# Patient Record
Sex: FEMALE | Race: BLACK OR AFRICAN AMERICAN | Employment: OTHER | URBAN - METROPOLITAN AREA
[De-identification: names, ages, dates, MRNs, and addresses within clinical notes are randomized per-mention and may not be internally consistent; named-entity substitution may affect disease eponyms.]

---

## 2020-09-26 ENCOUNTER — HOSPITAL ENCOUNTER (EMERGENCY)
Age: 50
Discharge: HOME | End: 2020-09-26
Attending: EMERGENCY MEDICINE
Payer: COMMERCIAL

## 2020-09-26 VITALS
OXYGEN SATURATION: 100 % | TEMPERATURE: 99 F | DIASTOLIC BLOOD PRESSURE: 82 MMHG | RESPIRATION RATE: 16 BRPM | HEART RATE: 77 BPM | SYSTOLIC BLOOD PRESSURE: 110 MMHG

## 2020-09-26 PROCEDURE — 99282 EMERGENCY DEPT VISIT SF MDM: CPT

## 2020-09-26 RX ORDER — AMOXICILLIN 500 MG/1
500 CAPSULE ORAL 2 TIMES DAILY
Qty: 20 CAPSULE | Refills: 0 | Status: SHIPPED | OUTPATIENT
Start: 2020-09-26 | End: 2020-10-06

## 2020-09-26 RX ORDER — PSEUDOEPHEDRINE HYDROCHLORIDE 30 MG/1
30 TABLET ORAL EVERY 6 HOURS PRN
Qty: 12 TABLET | Refills: 1 | Status: SHIPPED | OUTPATIENT
Start: 2020-09-26 | End: 2020-09-29

## 2020-09-26 ASSESSMENT — ENCOUNTER SYMPTOMS
ABDOMINAL PAIN: 0
RHINORRHEA: 0
NAUSEA: 0
BLOOD IN STOOL: 0
SINUS PRESSURE: 0
WHEEZING: 0
SINUS PAIN: 0
TROUBLE SWALLOWING: 0
SORE THROAT: 0
COUGH: 0
DIARRHEA: 0
VOICE CHANGE: 0
SHORTNESS OF BREATH: 0
CONSTIPATION: 0
EYE DISCHARGE: 0
EYE ITCHING: 0
VOMITING: 0

## 2020-09-26 ASSESSMENT — PAIN DESCRIPTION - LOCATION: LOCATION: EAR

## 2020-09-26 ASSESSMENT — PAIN DESCRIPTION - PAIN TYPE: TYPE: ACUTE PAIN

## 2020-09-26 ASSESSMENT — PAIN SCALES - GENERAL: PAINLEVEL_OUTOF10: 7

## 2020-09-26 ASSESSMENT — PAIN DESCRIPTION - ORIENTATION: ORIENTATION: RIGHT

## 2020-09-26 NOTE — ED PROVIDER NOTES
ED Attending Attestation Note     Date of evaluation: 9/26/2020    This patient was seen by the resident, Dr. Eli Costa. I have seen and examined the patient, agree with the workup, evaluation, management and diagnosis. The care plan has been discussed. This is a pleasant 68-year-old female that presents today for right ear pain. She denies any associated fevers. Denies any shortness of breath or coughs. Eyes any history of diabetes or immunocompromise state. On exam, the patient is well-appearing in no acute distress. On her right TMs, there is an effusion with pus noted, however the TM is intact. Left TM is normal.  She has some right-sided cervical lymphadenopathy. Posterior pharynx visualized without exudates, normal uvula. We will treat with antibiotics. Return precautions were discussed. Motrin Tylenol as needed for ear pain. Follow-up with pulmonary care physician. Please see resident note for reassessments and final disposition.         Maribell Stewart MD  09/26/20 [id number]

## 2020-09-26 NOTE — ED NOTES
Patient prepared for and ready to be discharged. Dressed in clothes and given belongings. IV removed, pt tolerated well, no complications. Patient discharged at this time in no acute distress after she verbalized understanding of discharge instructions. Reviewed medications, and when to return to the ED with patient. Encouraged follow up with PCP  Patient walked to lobby with steady gait, to take self home.       Allanesha Smith-Narcisse, RN  09/26/20 [id number]

## 2020-09-26 NOTE — ED PROVIDER NOTES
[address] ENCOUNTER          EM RESIDENT NOTE       Date of evaluation: 9/26/2020    Chief Complaint     Otalgia and Ear Fullness      History of Present Illness     Anamaria Novoa is a 44 y.o. female who presents for right ear otalgia and fullness since Monday with increasing discomfort. Today she has been unable to' pop' her ears. She states that when she was younger she had a history of ear infections and required tympanostomy use. She has not had issues since. She denies any facial pain, fevers, chills, rhinorrhea, congestion. She has no history of diabetes, or other reasons to be immunosuppressed. Review of Systems     Review of Systems   Constitutional: Negative for activity change, chills and fever. HENT: Positive for ear pain. Negative for congestion, rhinorrhea, sinus pressure, sinus pain, sneezing, sore throat, tinnitus, trouble swallowing and voice change. Eyes: Negative for discharge and itching. Respiratory: Negative for cough, shortness of breath and wheezing. Cardiovascular: Negative for chest pain and palpitations. Gastrointestinal: Negative for abdominal pain, blood in stool, constipation, diarrhea, nausea and vomiting. Endocrine: Negative for cold intolerance and heat intolerance. Genitourinary: Negative for dysuria and flank pain. Musculoskeletal: Negative for gait problem and joint swelling. Skin: Negative for pallor and rash. Neurological: Negative for dizziness, light-headedness and headaches. Past Medical, Surgical, Family, and Social History     She has a past medical history of Cervical cancer screening, History of mammogram, MS (multiple sclerosis) ([facility] [address] and STD (sexually transmitted disease). She has a past surgical history that includes other surgical history and Stomach surgery (2009). Her family history includes Cancer in her father and mother; Diabetes in her father and mother.   She reports that she has never smoked. She has never used smokeless tobacco. She reports current alcohol use of about 2.0 standard drinks of alcohol per week. She reports that she does not use drugs. Medications     Previous Medications    HYDROXYZINE (ATARAX) 50 MG TABLET    Take 1 tablet by mouth nightly as needed Take 30-60mins before sleeping. MULTIPLE VITAMINS-MINERALS (THERAPEUTIC MULTIVITAMIN-MINERALS) TABLET    Take 1 tablet by mouth daily    NAPROXEN (NAPROSYN) 500 MG TABLET    Take 1 tablet by mouth 2 times daily (with meals)       Allergies     She has No Known Allergies. Physical Exam     INITIAL VITALS: BP: 110/82, Temp: 99 °F (37.2 °C), Pulse: 77, Resp: 16, SpO2: 100 %   Physical Exam  Constitutional:       General: She is not in acute distress. Appearance: She is well-developed. HENT:      Head: Normocephalic and atraumatic. Left Ear: Tympanic membrane normal.      Ears:      Comments: Right TM with bulging and erythema of the TM with post TM white/clear fluid collection. Nose: Nose normal.   Eyes:      General: No scleral icterus. Conjunctiva/sclera: Conjunctivae normal.      Pupils: Pupils are equal, round, and reactive to light. Neck:      Musculoskeletal: Normal range of motion and neck supple. Trachea: No tracheal deviation. Cardiovascular:      Rate and Rhythm: Normal rate and regular rhythm. Pulses: Normal pulses. Pulmonary:      Effort: Pulmonary effort is normal. No respiratory distress. Breath sounds: Normal breath sounds. No stridor. No wheezing or rales. Abdominal:      General: Bowel sounds are normal. There is no distension. Palpations: Abdomen is soft. There is no mass. Tenderness: There is no abdominal tenderness. There is no guarding or rebound. Musculoskeletal: Normal range of motion. General: No tenderness or deformity. Skin:     General: Skin is warm and dry. Capillary Refill: Capillary refill takes less than 2 seconds.       Findings: times daily for 10 days    PSEUDOEPHEDRINE (DECONGESTANT) 30 MG TABLET    Take 1 tablet by mouth every 6 hours as needed for Congestion       DISPOSITION Decision To Discharge 09/26/2020 02:56:24 Jay Shah MD  Resident  09/26/20 [id number]

## 2023-07-04 ENCOUNTER — HOSPITAL ENCOUNTER (EMERGENCY)
Age: 53
Discharge: HOME | End: 2023-07-04
Attending: EMERGENCY MEDICINE
Payer: COMMERCIAL

## 2023-07-04 VITALS
SYSTOLIC BLOOD PRESSURE: 122 MMHG | HEART RATE: 74 BPM | TEMPERATURE: 98.3 F | OXYGEN SATURATION: 98 % | HEIGHT: 63 IN | WEIGHT: 157 LBS | DIASTOLIC BLOOD PRESSURE: 81 MMHG | BODY MASS INDEX: 27.82 KG/M2 | RESPIRATION RATE: 16 BRPM

## 2023-07-04 DIAGNOSIS — H60.391 INFECTIVE OTITIS EXTERNA OF RIGHT EAR: Primary | ICD-10-CM

## 2023-07-04 PROCEDURE — 99283 EMERGENCY DEPT VISIT LOW MDM: CPT

## 2023-07-04 PROCEDURE — 6370000000 HC RX 637 (ALT 250 FOR IP): Performed by: EMERGENCY MEDICINE

## 2023-07-04 RX ORDER — AMOXICILLIN 500 MG/1
500 CAPSULE ORAL 3 TIMES DAILY
Qty: 21 CAPSULE | Refills: 0 | Status: SHIPPED | OUTPATIENT
Start: 2023-07-04 | End: 2023-07-11

## 2023-07-04 RX ORDER — AMOXICILLIN 250 MG/1
500 CAPSULE ORAL ONCE
Status: COMPLETED | OUTPATIENT
Start: 2023-07-04 | End: 2023-07-04

## 2023-07-04 RX ORDER — CIPROFLOXACIN AND DEXAMETHASONE 3; 1 MG/ML; MG/ML
4 SUSPENSION/ DROPS AURICULAR (OTIC) 2 TIMES DAILY
Qty: 7.5 ML | Refills: 0 | Status: SHIPPED | OUTPATIENT
Start: 2023-07-04 | End: 2023-07-11

## 2023-07-04 RX ADMIN — AMOXICILLIN 500 MG: 250 CAPSULE ORAL at 14:50

## 2023-07-04 ASSESSMENT — PAIN DESCRIPTION - ORIENTATION: ORIENTATION: RIGHT

## 2023-07-04 ASSESSMENT — PAIN - FUNCTIONAL ASSESSMENT: PAIN_FUNCTIONAL_ASSESSMENT: 0-10

## 2023-07-04 ASSESSMENT — PAIN DESCRIPTION - LOCATION: LOCATION: EAR

## 2023-07-04 ASSESSMENT — PAIN DESCRIPTION - DESCRIPTORS: DESCRIPTORS: PRESSURE

## 2023-07-04 ASSESSMENT — PAIN SCALES - GENERAL: PAINLEVEL_OUTOF10: 5

## 2023-07-04 NOTE — ED PROVIDER NOTES
[facility] Emergency Department      Pt Name: Yury Manuel  MRN: [id number]  [address] [facility] 1970  Date of evaluation: 7/4/2023  Provider: Rod Weeks MD  CHIEF COMPLAINT  Chief Complaint   Patient presents with    Otalgia     Pt presents with R ear pain/fullness. Had the same issue a year ago and followed with an ENT     HPI  Yury Manuel is a 52 y.o. female who presents because of right ear pain/fullness. She has had symptoms for the past 3 days. She had a similar issue about a year ago when she saw an ENT provider. She denies any fever. She has had some nasal congestion. She is been taking Claritin for that. She has noticed some drainage. Denies any nausea or vomiting. She has had a slight dizzy feeling but no falls and has a history of MS.    REVIEW OF SYSTEMS:  See HPI for further details. Remainder of pertinent ROS reviewed and negative. Nursing notes reviewed. PAST MEDICAL HISTORY  Past Medical History:   Diagnosis Date    Cervical cancer screening 8/2015    Negative:under care of gyn:Dr. Smith    History of mammogram 8/2015     Nml per pt'    MS (multiple sclerosis) ([address] [facility])     Diagnosed at 21yrs age:under care of neurologist:Dr. Trupti Alonso appt. STD (sexually transmitted disease) 2/2015    trichomonas     SURGICAL HISTORY  Past Surgical History:   Procedure Laterality Date    OTHER SURGICAL HISTORY      gastric sleeve    STOMACH SURGERY  2009    gastric sleeve     MEDICATIONS:  No current facility-administered medications on file prior to encounter. Current Outpatient Medications on File Prior to Encounter   Medication Sig Dispense Refill    Multiple Vitamins-Minerals (THERAPEUTIC MULTIVITAMIN-MINERALS) tablet Take 1 tablet by mouth daily      hydrOXYzine (ATARAX) 50 MG tablet Take 1 tablet by mouth nightly as needed Take 30-60mins before sleeping.  14 tablet 0    naproxen (NAPROSYN) 500 MG tablet Take 1 tablet by mouth 2 times daily (with meals) 30 tablet 0

## 2024-01-07 ENCOUNTER — APPOINTMENT (OUTPATIENT)
Dept: GENERAL RADIOLOGY | Age: 54
End: 2024-01-07
Payer: COMMERCIAL

## 2024-01-07 ENCOUNTER — HOSPITAL ENCOUNTER (EMERGENCY)
Age: 54
Discharge: HOME | End: 2024-01-07
Attending: EMERGENCY MEDICINE
Payer: COMMERCIAL

## 2024-01-07 VITALS
SYSTOLIC BLOOD PRESSURE: 140 MMHG | TEMPERATURE: 97.8 F | OXYGEN SATURATION: 100 % | RESPIRATION RATE: 14 BRPM | DIASTOLIC BLOOD PRESSURE: 87 MMHG | HEIGHT: 63 IN | BODY MASS INDEX: 29.07 KG/M2 | HEART RATE: 81 BPM | WEIGHT: 164.1 LBS

## 2024-01-07 DIAGNOSIS — R21 RASH AND OTHER NONSPECIFIC SKIN ERUPTION: Primary | ICD-10-CM

## 2024-01-07 LAB
ALBUMIN SERPL-MCNC: 4.4 G/DL (ref 3.4–5)
ALBUMIN/GLOB SERPL: 1.6 {RATIO} (ref 1.1–2.2)
ALP SERPL-CCNC: 83 U/L (ref 40–129)
ALT SERPL-CCNC: <5 U/L (ref 10–40)
ANION GAP SERPL CALCULATED.3IONS-SCNC: 10 MMOL/L (ref 3–16)
AST SERPL-CCNC: 15 U/L (ref 15–37)
BASOPHILS # BLD: 0 K/UL (ref 0–0.2)
BASOPHILS NFR BLD: 1 %
BILIRUB SERPL-MCNC: <0.2 MG/DL (ref 0–1)
BUN SERPL-MCNC: 13 MG/DL (ref 7–20)
CALCIUM SERPL-MCNC: 9.4 MG/DL (ref 8.3–10.6)
CHLORIDE SERPL-SCNC: 107 MMOL/L (ref 99–110)
CO2 SERPL-SCNC: 26 MMOL/L (ref 21–32)
CREAT SERPL-MCNC: 0.6 MG/DL (ref 0.6–1.1)
DEPRECATED RDW RBC AUTO: 13.7 % (ref 12.4–15.4)
EOSINOPHIL # BLD: 0.1 K/UL (ref 0–0.6)
EOSINOPHIL NFR BLD: 2 %
GFR SERPLBLD CREATININE-BSD FMLA CKD-EPI: >60 ML/MIN/{1.73_M2}
GLUCOSE SERPL-MCNC: 96 MG/DL (ref 70–99)
HCT VFR BLD AUTO: 35.5 % (ref 36–48)
HGB BLD-MCNC: 11.9 G/DL (ref 12–16)
LACTATE BLDV-SCNC: 1 MMOL/L (ref 0.4–1.9)
LYMPHOCYTES # BLD: 1.3 K/UL (ref 1–5.1)
LYMPHOCYTES NFR BLD: 33 %
MCH RBC QN AUTO: 33.7 PG (ref 26–34)
MCHC RBC AUTO-ENTMCNC: 33.6 G/DL (ref 31–36)
MCV RBC AUTO: 100.5 FL (ref 80–100)
MONOCYTES # BLD: 0.3 K/UL (ref 0–1.3)
MONOCYTES NFR BLD: 6.6 %
NEUTROPHILS # BLD: 2.2 K/UL (ref 1.7–7.7)
NEUTROPHILS NFR BLD: 57.4 %
PLATELET # BLD AUTO: 198 K/UL (ref 135–450)
PMV BLD AUTO: 10 FL (ref 5–10.5)
POTASSIUM SERPL-SCNC: 4 MMOL/L (ref 3.5–5.1)
PROT SERPL-MCNC: 7.2 G/DL (ref 6.4–8.2)
RBC # BLD AUTO: 3.53 M/UL (ref 4–5.2)
SLIDE REVIEW: ABNORMAL
SODIUM SERPL-SCNC: 143 MMOL/L (ref 136–145)
WBC # BLD AUTO: 3.8 K/UL (ref 4–11)

## 2024-01-07 PROCEDURE — 85025 COMPLETE CBC W/AUTO DIFF WBC: CPT

## 2024-01-07 PROCEDURE — 80053 COMPREHEN METABOLIC PANEL: CPT

## 2024-01-07 PROCEDURE — 99285 EMERGENCY DEPT VISIT HI MDM: CPT

## 2024-01-07 PROCEDURE — 86140 C-REACTIVE PROTEIN: CPT

## 2024-01-07 PROCEDURE — 85652 RBC SED RATE AUTOMATED: CPT

## 2024-01-07 PROCEDURE — 83605 ASSAY OF LACTIC ACID: CPT

## 2024-01-07 PROCEDURE — 87040 BLOOD CULTURE FOR BACTERIA: CPT

## 2024-01-07 PROCEDURE — 73660 X-RAY EXAM OF TOE(S): CPT

## 2024-01-07 RX ORDER — TIZANIDINE 2 MG/1
1-2 TABLET ORAL EVERY 8 HOURS PRN
COMMUNITY
Start: 2023-06-19

## 2024-01-07 RX ORDER — PREGABALIN 200 MG/1
200 CAPSULE ORAL 2 TIMES DAILY
COMMUNITY
Start: 2023-09-16 | End: 2024-01-15

## 2024-01-07 ASSESSMENT — PAIN - FUNCTIONAL ASSESSMENT: PAIN_FUNCTIONAL_ASSESSMENT: NONE - DENIES PAIN

## 2024-01-07 NOTE — ED NOTES
Patient to ed with complaints of a rash on her left hand which started 4 days ago and started as a itchy blister, blister has resolved and appears to be healing, patient denies injury.

## 2024-01-07 NOTE — ED PROVIDER NOTES
Emergency Department Provider Note  Location: [facility] EMERGENCY DEPARTMENT  1/7/2024     Patient Identification  Katina Ivory is a 53 y.o. female    Chief Complaint  Rash (Left hand)          HPI  (History provided by patient)  Patient is a 53-year-old female with a history of MS not on immunomodulator or immunosuppressant therapy who presents with a rash on the dorsum of the left hand over the past 4 days.  Describes a painful erythematous rash.  Denies any injury to the area.  Also reports that she has a sore or ulcer on the roof of her mouth.  Denies any fevers chills nausea vomiting bloody stools or hematuria.  Reports that she was treated for paronychia of the left toe status post I&D at outside facility December 26 and completed a course of Bactrim.  She reports she still has some swelling and mild weeping proximal to the nail.      Nursing Notes were all reviewed and agreed with, or any disagreements were addressed in the HPI:  Allergies: No Known Allergies    Past medical history:  has a past medical history of Cervical cancer screening (8/2015), History of mammogram (8/2015 ), MS (multiple sclerosis) (HCC), and STD (sexually transmitted disease) (2/2015).    Past surgical history:  has a past surgical history that includes other surgical history and Stomach surgery (2009).    Home medications:   Prior to Admission medications    Medication Sig Start Date End Date Taking? Authorizing Provider   pregabalin (LYRICA) 200 MG capsule Take 1 capsule by mouth 2 times daily. 9/16/23 1/15/24 Yes ProviderNeha MD   tiZANidine (ZANAFLEX) 2 MG tablet Take 0.5-1 tablets by mouth every 8 hours as needed 6/19/23  Yes ProviderNeha MD   Multiple Vitamins-Minerals (THERAPEUTIC MULTIVITAMIN-MINERALS) tablet Take 1 tablet by mouth daily    ProviderNeha MD   hydrOXYzine (ATARAX) 50 MG tablet Take 1 tablet by mouth nightly as needed Take 30-60mins before sleeping. 1/4/16   Etienne Burdick MD

## 2024-01-07 NOTE — DISCHARGE INSTR - COC
Continuity of Care Form    Patient Name: Katina Ivory   :  1970  MRN:  [id number]    Admit date:  2024  Discharge date:  ***    Code Status Order: No Order   Advance Directives:     Admitting Physician:  No admitting provider for patient encounter.  PCP: Anamaria Titus MD    Discharging Nurse: ***  Discharging Hospital Unit/Room#:   Discharging Unit Phone Number: ***    Emergency Contact:   Extended Emergency Contact Information  Primary Emergency Contact: Donna Ivory   [facility]  Home Phone: [phone]  Relation: Parent  Secondary Emergency Contact: Archie Ivory   [facility]  Home Phone: [phone]  Relation: Child    Past Surgical History:  Past Surgical History:   Procedure Laterality Date    OTHER SURGICAL HISTORY      gastric sleeve    STOMACH SURGERY      gastric sleeve       Immunization History:   Immunization History   Administered Date(s) Administered    COVID-19, PFIZER PURPLE top, DILUTE for use, (age 12 y+), 30mcg/0.3mL 2021, 2021, 2021    COVID-19, PFIZER, ( formula), (age 12y+), IM, 30mcg/0.3mL 10/09/2023       Active Problems:  Patient Active Problem List   Diagnosis Code    Thoracic back pain M54.6       Isolation/Infection:   Isolation            No Isolation           Unreconciled Outside Infections       External data in this report might not trigger clinical decision support.    .      Infection Onset Last Indicated Last Received Source    No mapped external infections found      .    Unmapped Infections        M[facility]                  Patient Infection Status       None to display            Nurse Assessment:  Last Vital Signs: BP (!) 140/87   Pulse 81   Temp 97.8 °F (36.6 °C) (Oral)   Resp 14   Ht 1.6 m (5' 3\")   Wt 74.4 kg (164 lb 1.6 oz)   LMP 2014   SpO2 100%   BMI 29.07 kg/m²     Last documented pain score (0-10 scale):    Last Weight:   Wt Readings from Last 1

## 2024-01-07 NOTE — ED NOTES
Patient did not want to be admitted she signed out AMA and will follow-up with her md tomorrow morning.

## 2024-01-08 LAB
CRP SERPL-MCNC: 4.6 MG/L (ref 0–5.1)
ERYTHROCYTE [SEDIMENTATION RATE] IN BLOOD BY WESTERGREN METHOD: 14 MM/HR (ref 0–30)

## 2024-01-12 LAB
BACTERIA BLD CULT ORG #2: NORMAL
BACTERIA BLD CULT: NORMAL

## 2024-06-03 ENCOUNTER — APPOINTMENT (OUTPATIENT)
Dept: GENERAL RADIOLOGY | Age: 54
End: 2024-06-03
Payer: COMMERCIAL

## 2024-06-03 ENCOUNTER — HOSPITAL ENCOUNTER (EMERGENCY)
Age: 54
Discharge: HOME | End: 2024-06-03
Attending: STUDENT IN AN ORGANIZED HEALTH CARE EDUCATION/TRAINING PROGRAM
Payer: COMMERCIAL

## 2024-06-03 VITALS
TEMPERATURE: 99.4 F | HEART RATE: 97 BPM | RESPIRATION RATE: 16 BRPM | WEIGHT: 159 LBS | BODY MASS INDEX: 28.17 KG/M2 | HEIGHT: 63 IN | SYSTOLIC BLOOD PRESSURE: 129 MMHG | DIASTOLIC BLOOD PRESSURE: 89 MMHG | OXYGEN SATURATION: 100 %

## 2024-06-03 DIAGNOSIS — J40 BRONCHITIS: Primary | ICD-10-CM

## 2024-06-03 LAB
FLUAV RNA UPPER RESP QL NAA+PROBE: NEGATIVE
FLUBV AG NPH QL: NEGATIVE
SARS-COV-2 RDRP RESP QL NAA+PROBE: NOT DETECTED

## 2024-06-03 PROCEDURE — 6370000000 HC RX 637 (ALT 250 FOR IP): Performed by: STUDENT IN AN ORGANIZED HEALTH CARE EDUCATION/TRAINING PROGRAM

## 2024-06-03 PROCEDURE — 99284 EMERGENCY DEPT VISIT MOD MDM: CPT

## 2024-06-03 PROCEDURE — 87635 SARS-COV-2 COVID-19 AMP PRB: CPT

## 2024-06-03 PROCEDURE — 71046 X-RAY EXAM CHEST 2 VIEWS: CPT

## 2024-06-03 PROCEDURE — 87804 INFLUENZA ASSAY W/OPTIC: CPT

## 2024-06-03 RX ORDER — PREDNISONE 10 MG/1
TABLET ORAL
Qty: 20 TABLET | Refills: 0 | Status: SHIPPED | OUTPATIENT
Start: 2024-06-03 | End: 2024-06-13

## 2024-06-03 RX ORDER — ALBUTEROL SULFATE 90 UG/1
2 AEROSOL, METERED RESPIRATORY (INHALATION) 4 TIMES DAILY PRN
Qty: 18 G | Refills: 0 | Status: SHIPPED | OUTPATIENT
Start: 2024-06-03

## 2024-06-03 RX ORDER — IPRATROPIUM BROMIDE AND ALBUTEROL SULFATE 2.5; .5 MG/3ML; MG/3ML
1 SOLUTION RESPIRATORY (INHALATION) ONCE
Status: COMPLETED | OUTPATIENT
Start: 2024-06-03 | End: 2024-06-03

## 2024-06-03 RX ORDER — PREDNISONE 20 MG/1
60 TABLET ORAL ONCE
Status: COMPLETED | OUTPATIENT
Start: 2024-06-03 | End: 2024-06-03

## 2024-06-03 RX ADMIN — PREDNISONE 60 MG: 20 TABLET ORAL at 17:49

## 2024-06-03 RX ADMIN — IPRATROPIUM BROMIDE AND ALBUTEROL SULFATE 1 DOSE: 2.5; .5 SOLUTION RESPIRATORY (INHALATION) at 17:50

## 2024-06-03 ASSESSMENT — PAIN - FUNCTIONAL ASSESSMENT: PAIN_FUNCTIONAL_ASSESSMENT: NONE - DENIES PAIN

## 2024-06-03 NOTE — DISCHARGE INSTRUCTIONS
You were evaluated in the emergency department for cough and wheezing. Assessments and testing completed during your visit were reassuring and at this time there is no indication for further testing, treatment or admission to the hospital. Given this it is appropriate to discharge you from the emergency department. At the time of discharge we discussed the following:    Testing for influenza and COVID was negative.  There is no evidence of pneumonia.  I expect you have a postviral bronchitis which will improve with time and I have prescribed steroids and a albuterol inhaler to assist your recovery.    Please note that sometimes it is difficult to diagnose a medical condition early in the disease process before the disease is fully manifest. Because of this, should you develop any new or worsening symptoms, you may return at any time to the emergency department for another evaluation. If available you are also recommended to review this visit with your primary care physician or other medical provider in the next 7 days. Thank you for allowing us to care for you today.

## 2024-06-03 NOTE — ED PROVIDER NOTES
[facility] EMERGENCY DEPARTMENT     EMERGENCY DEPARTMENT ENCOUNTER         Pt Name: Katina Ivory   MRN: [id number]   Birthdate 1970   Date of evaluation: 6/3/2024   Provider: Andrés Lino MD   PCP: Anamaria Titus MD   Note Started: 5:27 PM EDT 6/3/24       Chief Complaint     Illness (Pt presents with coughing, and nasal congestion x1 week. Has been taking OTC medications- mucinex and Sudafed w/ no relief)      History of Present Illness     Katina Ivory is a 54 y.o. female who presents with 1 week of illness.  She has no major contributing past medical history though is a long-term smoker.  Denies any formal diagnosis of pulmonary disease.  She states that roughly 1 week ago she attended a high school graduation and after that she developed congestion and a sore throat.  Several days later her illness seemed to evolve no longer with congestion sore throat but now with cough and wheezing which has persisted today and therefore she presents for evaluation.  She has not previously experienced this type of pulmonary symptoms.  Despite her illness she is maintaining adequate oral intake of fluids and nutrition baseline GI/ function no other acute complaints.    Of note the patient has a history of multiple sclerosis not currently on any immunosuppressants last flareup was 7 years ago.      I have reviewed the nursing notes and agree unless otherwise noted.    Review of Systems     Positives and pertinent negatives as per HPI.    Past Medical, Surgical, Family, and Social History     She has a past medical history of Cervical cancer screening, History of mammogram, MS (multiple sclerosis) (HCC), and STD (sexually transmitted disease).  She has a past surgical history that includes other surgical history and Stomach surgery (2009).  Her family history includes Cancer in her father and mother; Diabetes in her father and mother.  She reports that she has quit smoking. Her smoking use included